# Patient Record
Sex: MALE | Race: WHITE | NOT HISPANIC OR LATINO
[De-identification: names, ages, dates, MRNs, and addresses within clinical notes are randomized per-mention and may not be internally consistent; named-entity substitution may affect disease eponyms.]

---

## 2020-01-02 ENCOUNTER — TRANSCRIPTION ENCOUNTER (OUTPATIENT)
Age: 30
End: 2020-01-02

## 2020-05-03 ENCOUNTER — TRANSCRIPTION ENCOUNTER (OUTPATIENT)
Age: 30
End: 2020-05-03

## 2024-03-22 ENCOUNTER — TRANSCRIPTION ENCOUNTER (OUTPATIENT)
Age: 34
End: 2024-03-22

## 2024-03-22 ENCOUNTER — APPOINTMENT (OUTPATIENT)
Dept: ORTHOPEDIC SURGERY | Facility: CLINIC | Age: 34
End: 2024-03-22
Payer: COMMERCIAL

## 2024-03-22 VITALS — BODY MASS INDEX: 25.77 KG/M2 | HEIGHT: 70 IN | WEIGHT: 180 LBS

## 2024-03-22 DIAGNOSIS — Z78.9 OTHER SPECIFIED HEALTH STATUS: ICD-10-CM

## 2024-03-22 DIAGNOSIS — Z00.00 ENCOUNTER FOR GENERAL ADULT MEDICAL EXAMINATION W/OUT ABNORMAL FINDINGS: ICD-10-CM

## 2024-03-22 PROCEDURE — 99204 OFFICE O/P NEW MOD 45 MIN: CPT

## 2024-03-22 PROCEDURE — 72050 X-RAY EXAM NECK SPINE 4/5VWS: CPT

## 2024-03-22 RX ORDER — MELOXICAM 15 MG/1
15 TABLET ORAL DAILY
Qty: 21 | Refills: 0 | Status: ACTIVE | COMMUNITY
Start: 2024-03-22 | End: 1900-01-01

## 2024-03-22 NOTE — IMAGING
[Disc space narrowing] : Disc space narrowing [No instability seen on flexion/extension] : No instability seen on flexion/extension

## 2024-03-22 NOTE — ASSESSMENT
[FreeTextEntry1] : 33 M with neck pain and intermittent spasm PT  We will also provide a prescription for anti-inflammatories.  Discussed major side effects of medication including but not limited to gastritis and acute kidney injury.  He was instructed to take with food and to discontinue use if stomach or esophageal pain developed. FU 6 weeks

## 2024-03-22 NOTE — HISTORY OF PRESENT ILLNESS
[Neck] : neck [Dull/Aching] : dull/aching [Sharp] : sharp [Intermittent] : intermittent [de-identified] : 03/22/2024: 33- year- old RHD male presents today with complaints of neck pain and tightness that has been present for States he 6-8 months ago. Patient has been applying warm/cold compresses for relief. Denies radicular pain, n/t. denies change in dexterity or fine motor skills.  No PmHx, NKDA Occupation: Office  [] : Post Surgical Visit: no

## 2024-05-01 ENCOUNTER — APPOINTMENT (OUTPATIENT)
Dept: ORTHOPEDIC SURGERY | Facility: CLINIC | Age: 34
End: 2024-05-01
Payer: COMMERCIAL

## 2024-05-01 VITALS — BODY MASS INDEX: 25.77 KG/M2 | HEIGHT: 70 IN | WEIGHT: 180 LBS

## 2024-05-01 DIAGNOSIS — M54.2 CERVICALGIA: ICD-10-CM

## 2024-05-01 PROCEDURE — 99213 OFFICE O/P EST LOW 20 MIN: CPT

## 2024-05-01 NOTE — ASSESSMENT
[FreeTextEntry1] : 33 M with neck pain and intermittent spasm. improving with PT C/w PT  We will also provide a prescription for anti-inflammatories.  Discussed major side effects of medication including but not limited to gastritis and acute kidney injury.  He was instructed to take with food and to discontinue use if stomach or esophageal pain developed.  NSAIDS PRN now.  FU 8 weeks if pain persits MRI C spine and consider ABIOLA

## 2024-05-01 NOTE — HISTORY OF PRESENT ILLNESS
[Neck] : neck [Dull/Aching] : dull/aching [Sharp] : sharp [Intermittent] : intermittent [Meds] : meds [Physical therapy] : physical therapy [de-identified] : 05/01/2024: patient here to follow up on neck pain. Pain improved partially but still having pain in the mornings and at night.   03/22/2024: 33- year- old RHD male presents today with complaints of neck pain and tightness that has been present for States he 6-8 months ago. Patient has been applying warm/cold compresses for relief. Denies radicular pain, n/t. denies change in dexterity or fine motor skills.  No PmHx, NKDA Occupation: Office  [] : Post Surgical Visit: no

## 2024-07-10 ENCOUNTER — APPOINTMENT (OUTPATIENT)
Dept: ORTHOPEDIC SURGERY | Facility: CLINIC | Age: 34
End: 2024-07-10